# Patient Record
Sex: FEMALE | Race: WHITE | NOT HISPANIC OR LATINO | Employment: OTHER | URBAN - METROPOLITAN AREA
[De-identification: names, ages, dates, MRNs, and addresses within clinical notes are randomized per-mention and may not be internally consistent; named-entity substitution may affect disease eponyms.]

---

## 2017-01-17 ENCOUNTER — GENERIC CONVERSION - ENCOUNTER (OUTPATIENT)
Dept: OTHER | Facility: OTHER | Age: 82
End: 2017-01-17

## 2017-02-08 ENCOUNTER — GENERIC CONVERSION - ENCOUNTER (OUTPATIENT)
Dept: OTHER | Facility: OTHER | Age: 82
End: 2017-02-08

## 2018-01-12 NOTE — RESULT NOTES
Verified Results  (1) URINALYSIS w URINE C/S REFLEX (will reflex a microscopy if leukocytes, occult blood, or nitrites are not within normal limits) 23Sep2016 12:50PM Shelly Jimenez Order Number: [id number]     Test Name Result Flag Reference   COLOR Yellow     CLARITY Clear     PH UA 6 5  4 5-8 0   LEUKOCYTE ESTERASE UA Negative  Negative   NITRITE UA Negative  Negative   PROTEIN UA Negative mg/dl  Negative   GLUCOSE UA Negative mg/dl  Negative   KETONES UA Negative mg/dl  Negative   UROBILINOGEN UA 1 0 E U /dl  0 2, 1 0 E U /dl   BILIRUBIN UA Negative  Negative   BLOOD UA Negative  Negative   SPECIFIC GRAVITY UA 1 021  1 003-1 030     (1) CBC/PLT/DIFF 23Sep2016 12:49PM Shelly Jimenez Order Number: [id number]     Test Name Result Flag Reference   WBC COUNT 5 54 Thousand/uL  4 31-10 16   RBC COUNT 4 44 Million/uL  3 81-5 12   HEMOGLOBIN 13 2 g/dL  11 5-15 4   HEMATOCRIT 39 2 %  34 8-46  1   MCV 88 fL  82-98   MCH 29 7 pg  26 8-34 3   MCHC 33 7 g/dL  31 4-37 4   RDW 12 2 %  11 6-15 1   MPV 10 5 fL  8 9-12 7   PLATELET COUNT 191 Thousands/uL  149-390   nRBC AUTOMATED 0 /100 WBCs     NEUTROPHILS RELATIVE PERCENT 61 %  43-75   LYMPHOCYTES RELATIVE PERCENT 27 %  14-44   MONOCYTES RELATIVE PERCENT 11 %  4-12   EOSINOPHILS RELATIVE PERCENT 1 %  0-6   BASOPHILS RELATIVE PERCENT 0 %  0-1   NEUTROPHILS ABSOLUTE COUNT 3 38 Thousands/?L  1 85-7 62   LYMPHOCYTES ABSOLUTE COUNT 1 49 Thousands/?L  0 60-4 47   MONOCYTES ABSOLUTE COUNT 0 59 Thousand/?L  0 17-1 22   EOSINOPHILS ABSOLUTE COUNT 0 04 Thousand/?L  0 00-0 61   BASOPHILS ABSOLUTE COUNT 0 02 Thousands/?L  0 00-0 10   - Patient Instructions: This bloodwork is non-fasting  Please drink two glasses of water morning of bloodwork  - Patient Instructions: This bloodwork is non-fasting  Please drink two glasses of water morning of bloodwork       (1) COMPREHENSIVE METABOLIC PANEL 70CKP9342 26:54NX Rosa Isela BERMEO Order Number: [id number]     Test Name Result Flag Reference   GLUCOSE,RANDM 107 mg/dL     If the patient is fasting, the ADA then defines impaired fasting glucose as > 100 mg/dL and diabetes as > or equal to 123 mg/dL  SODIUM 139 mmol/L  136-145   POTASSIUM 4 2 mmol/L  3 5-5 3   CHLORIDE 103 mmol/L  100-108   CARBON DIOXIDE 28 mmol/L  21-32   ANION GAP (CALC) 8 mmol/L  4-13   BLOOD UREA NITROGEN 18 mg/dL  5-25   CREATININE 0 86 mg/dL  0 60-1 30   Standardized to IDMS reference method   CALCIUM 9 4 mg/dL  8 3-10 1   BILI, TOTAL 0 39 mg/dL  0 20-1 00   ALK PHOSPHATAS 79 U/L     ALT (SGPT) 19 U/L  12-78   AST(SGOT) 16 U/L  5-45   ALBUMIN 3 5 g/dL  3 5-5 0   TOTAL PROTEIN 7 2 g/dL  6 4-8 2   eGFR Non-African American      >60 0 ml/min/1 73[address] [facility] Disease Education Program recommendations are as follows:  GFR calculation is accurate only with a steady state creatinine  Chronic Kidney disease less than 60 ml/min/1 73 sq  meters  Kidney failure less than 15 ml/min/1 73 sq  meters

## 2018-01-12 NOTE — MISCELLANEOUS
Message  Kailalance Sweet from  hospice called office stating pt will be d/c from hospital and they will be treating her for hospice care  Per Dr Erik Andrews he will continue care for her  Active Problems    1  Abnormal blood chemistry (790 6) (R79 9)   2  Acute bronchitis (466 0) (J20 9)   3  Acute left-sided weakness (728 87) (M62 89)   4  Allergic rhinitis (477 9) (J30 9)   5  Alzheimers disease (331 0) (G30 9)   6  Asymptomatic menopausal state (V49 81) (Z78 0)   7  Benign colon polyp (211 3) (K63 5)   8  Bradycardia (427 89) (R00 1)   9  Cataract (366 9) (H26 9)   10  Depression (311) (F32 9)   11  Diverticulosis (562 10) (K57 90)   12  DM type 2 (diabetes mellitus, type 2) (250 00) (E11 9)   13  Encounter for screening for malignant neoplasm of colon (V76 51) (Z12 11)   14  Fall, accidental (E888 9) (W19 XXXA)   15  Gait disturbance (781 2) (R26 9)   16  Hyperlipidemia (272 4) (E78 5)   17  Knee pain (719 46) (M25 569)   18  Medicare annual wellness visit, initial (V70 0) (Z00 00)   19  Mobitz type II block (426 12) (I44 1)   20  Multiple falls (V15 88) (R29 6)   21  Need for influenza vaccination (V04 81) (Z23)   22  Need for tuberculosis vaccination (V03 2) (Z23)   23  Overactive bladder (596 51) (N32 81)   24  Preoperative general physical examination (V72 83) (Z01 818)   25  Screening for depression (V79 0) (Z13 89)   26  Screening for genitourinary condition (V81 6) (Z13 89)   27  Screening for neurological condition (V80 09) (Z13 89)   28  Transient ischemic attack (435 9) (G45 9)   29  Uncontrolled diabetes mellitus type 2 without complications (564 39) (Z77 89)   30  Urinary frequency (788 41) (R35 0)   31  Urinary tract infection (599 0) (N39 0)    Current Meds   1  Citalopram Hydrobromide 10 MG Oral Tablet; TAKE 1 TABLET DAILY; Therapy: 44BVR2557 to (Last Rx:22Apr2016)  Requested for: 22Apr2016 Ordered   2  Memantine HCl - 10 MG Oral Tablet; TAKE 1 TABLET TWICE DAILY; Therapy: 44JDX5825 to Recorded   3  MetFORMIN HCl  MG Oral Tablet Extended Release 24 Hour; TAKE 1 TABLET   DAILY AS DIRECTED; Therapy: 26JBN4905 to (Evaluate:10Dec2016)  Requested for: 91WMA9463; Last   Rx:13Jun2016 Ordered   4  TRUEplus Lancets 26G Miscellaneous; test twice daily and as needed; Therapy: 49EYF6240 to (Evaluate:12Jan2017)  Requested for: 38Oss0426; Last   Rx:72Mcb7532 Ordered   5  TRUEresult Blood Glucose w/Device Kit; USE AS DIRECTED; Therapy: 28MWY3726 to (Evaluate:14Oct2016)  Requested for: 35Med9026; Last   Rx:56Nso9237 Ordered   6  TRUEtest Test In Vitro Strip; TEST TWICE DAILY OR AS DIRECTED   ;   Therapy: 85XAC4274 to (Last Rx:42Hym2121)  Requested for: 22Pom4844 Ordered    Allergies    1   No Known Drug Allergies    Signatures   Electronically signed by : Michelle Stevens, ; Feb 8 2017 10:46AM EST                       (Author)

## 2018-01-16 NOTE — MISCELLANEOUS
Assessment    1  Acute left-sided weakness (728 87) (M62 89)   2  Gait disturbance (781 2) (R26 9)   3  Fall, accidental (E888 9) (I96 AGAO)    Plan  Acute left-sided weakness    · (1) CBC/PLT/DIFF; Status:Active; Requested APV:98ZNU9022;    Perform:[facility]; Due:97Hdf4923;Ordered; For:Acute left-sided weakness; Ordered By:Javid Quan;   · (1) COMPREHENSIVE METABOLIC PANEL; Status:Active; Requested VFA:53HYO8041;    Perform:[facility]; Due:58Tvw3602;Ordered; For:Acute left-sided weakness; Ordered By:Javid Quan;   · (1) URINALYSIS w URINE C/S REFLEX (will reflex a microscopy if leukocytes, occult  blood, or nitrites are not within normal limits); Status:Active; Requested URW:29OOM7173;    Perform:[facility]; Due:23Sep2017;Ordered; For:Acute left-sided weakness; Ordered By:Javid Quan;   · * CT HEAD WO CONTRAST; Status:Active; Requested PRN:[id number];    Perform:[facility]; TXJ:36NAR0568; Last Updated By:Felix Puri; 9/23/2016 12:59:49 PM;Ordered; For:Acute left-sided weakness; Ordered By:Javid Quan;  Hyperlipidemia    · Atorvastatin Calcium 20 MG Oral Tablet   Rx By: Daryle Baldy; Dispense: 90 Days ; #:90 Tablet; Refill: 3; For: Hyperlipidemia; VANI = N; Verified Transmission to [address]; Last Updated By: Laura Baker; 9/23/2016 11:37:32 AM  Need for influenza vaccination    · Fluzone High-Dose 0 5 ML Intramuscular Suspension Prefilled Syringe   For: Need for influenza vaccination; Ordered By:Sophie Quan; Effective Date:23Sep2016; Administered by: Laura Baker: 9/23/2016 11:51:00 AM    Discussion/Summary  Discussion Summary:   Hospital chart reviewed  CT of head with chronic changes and echo unchanged  Left sided changes are new since d/c  ??new CVA vs worsening SDAT  Will check CT scan  Doubt pt could tolerate MRI  Start empiric low dose ASA  Check labs and urine  Continue home PT  RTC one week        Chief Complaint  Chief Complaint Free Text Note Form: Patience Vicente - Fall  Patricia Gaston Pt doing better since fall   still has bruise on right side of head  Patricia Gaston Family states pt is leaning to left side and left hand/leg seems weaker than right    questions possible TIA? History of Present Illness  TCM Communication [facility]: The patient is being contacted for follow-up after hospitalization and pt scheduled for hospital f/u on 09/23/2016  Hospital records were not available  She was hospitalized at and [facility]  The date of admission: 08/25/2016, date of discharge: 09/07/2016  Diagnosis: General Weakness, Debilitation  She was discharged to home  Communication performed and completed by       HPI: ELVI with advanced SDAT, presents with her  and daughter for f/u recent admission at [address] [place] [address] [address]  Pt fell w/o LOC and was treated as a trauma  Admitted four days and w/u negative  D/c'd to the [facility] and spent some time there  Came home and receiving home PT  Family has notice pt to be leaning to the left and having weakness on that side as well  Pleasantly confused  Review of Systems  Complete-Female:   Constitutional: no fever, not feeling poorly, no chills and not feeling tired  Eyes: No complaints of eye pain, no red eyes, no eyesight problems, no discharge, no dry eyes, no itching of eyes  ENT: no complaints of earache, no loss of hearing, no nose bleeds, no nasal discharge, no sore throat, no hoarseness  Cardiovascular: no chest pain and no palpitations  Respiratory: no shortness of breath, no cough, no wheezing and no shortness of breath during exertion  Gastrointestinal: no abdominal pain, no nausea, no constipation and no diarrhea  Genitourinary: incontinence, but no dysuria  Musculoskeletal: No complaints of arthralgias, no myalgias, no joint swelling or stiffness, no limb pain or swelling  Neurological: confusion, limb weakness and difficulty walking, but no headache and no convulsions  Psychiatric: no anxiety and no depression  Active Problems    1  Abnormal blood chemistry (790 6) (R79 9)   2  Acute bronchitis (466 0) (J20 9)   3  Allergic rhinitis (477 9) (J30 9)   4  Alzheimers disease (331 0) (G30 9)   5  Asymptomatic menopausal state (V49 81) (Z78 0)   6  Benign colon polyp (211 3) (K63 5)   7  Bradycardia (427 89) (R00 1)   8  Cataract (366 9) (H26 9)   9  Depression (311) (F32 9)   10  Diverticulosis (562 10) (K57 90)   11  DM type 2 (diabetes mellitus, type 2) (250 00) (E11 9)   12  Encounter for screening for malignant neoplasm of colon (V76 51) (Z12 11)   13  Fall, accidental (E888 9) (W19 XXXA)   14  Gait disturbance (781 2) (R26 9)   15  Hyperlipidemia (272 4) (E78 5)   16  Knee pain (719 46) (M25 569)   17  Medicare annual wellness visit, initial (V70 0) (Z00 00)   18  Mobitz type II block (426 12) (I44 1)   19  Multiple falls (V15 88) (R29 6)   20  Need for influenza vaccination (V04 81) (Z23)   21  Need for tuberculosis vaccination (V03 2) (Z23)   22  Overactive bladder (596 51) (N32 81)   23  Preoperative general physical examination (V72 83) (Z01 818)   24  Screening for depression (V79 0) (Z13 89)   25  Screening for genitourinary condition (V81 6) (Z13 89)   26  Screening for neurological condition (V80 09) (Z13 89)   27  Transient ischemic attack (435 9) (G45 9)   28  Uncontrolled diabetes mellitus type 2 without complications (495 66) (B14 42)   29  Urinary frequency (788 41) (R35 0)   30  Urinary tract infection (599 0) (N39 0)    Past Medical History    1  History of Adult failure to thrive (783 7) (R62 7)   2  History of Benign Polyps Of The Large Intestine   3  History of Mental status change (780 97) (R41 82)   4  History of Onychomycosis (110 1) (B35 1)   5  History of Screening for genitourinary condition (V81 6) (Z13 89)   6  History of Screening for neurological condition (V80 09) (Z13 89)   7   History of Screening for neurological condition (V80 09) (Z13 89) 8  History of Screening for rheumatoid arthritis (V82 1) (I82 916)    Surgical History    1  History of Appendectomy   2  History of Hysterectomy  Surgical History Reviewed: The surgical history was reviewed and updated today  Family History  Mother    1  No pertinent family history  Family History Reviewed: The family history was reviewed and updated today  Social History    · Marital History - Currently    · Never a smoker  Social History Reviewed: The social history was reviewed and updated today  The social history was reviewed and is unchanged  Current Meds   1  Atorvastatin Calcium 20 MG Oral Tablet; Take 1 tablet daily; Therapy: 87Pkp0303 to (Evaluate:22Jan2017)  Requested for: 37BIZ3048; Last   Rx:28Jan2016 Ordered   2  Citalopram Hydrobromide 10 MG Oral Tablet; TAKE 1 TABLET DAILY; Therapy: 37ZRJ8096 to (Last Rx:22Apr2016)  Requested for: 22Apr2016 Ordered   3  Memantine HCl - 10 MG Oral Tablet; TAKE 1 TABLET TWICE DAILY; Therapy: 68QGR1404 to Recorded   4  MetFORMIN HCl  MG Oral Tablet Extended Release 24 Hour; TAKE 1 TABLET   DAILY AS DIRECTED; Therapy: 94WXX0336 to (Evaluate:52Kpe1962)  Requested for: 95DCP7790; Last   Rx:13Jun2016 Ordered   5  TRUEplus Lancets 26G Miscellaneous; test twice daily and as needed; Therapy: 37JBZ8746 to (Evaluate:12Jan2017)  Requested for: 10Jha9175; Last   Rx:86Czg3272 Ordered   6  TRUEresult Blood Glucose w/Device Kit; USE AS DIRECTED; Therapy: 55KGA4703 to (Evaluate:14Oct2016)  Requested for: 42Inj9031; Last   Rx:16Cqb3279 Ordered   7  TRUEtest Test In Vitro Strip; TEST TWICE DAILY OR AS DIRECTED   ;   Therapy: 37ULT6986 to (Last Rx:99Vne2554)  Requested for: 88Gbe0087 Ordered  Medication List Reviewed: The medication list was reviewed and updated today  Allergies    1   No Known Drug Allergies    Vitals  Signs   Recorded: 92CPJ8455 91:40EJ   Systolic: 430  Diastolic: 64  Heart Rate: 82  Respiration: 16  Temperature: 98 1 F  Height: 5 ft   Weight: 144 lb   BMI Calculated: 28 12  BSA Calculated: 1 62    Physical Exam    Constitutional   General appearance: No acute distress, well appearing and well nourished  Eyes   Conjunctiva and lids: No swelling, erythema or discharge  Pupils and irises: Equal, round and reactive to light  Ears, Nose, Mouth, and Throat   Oropharynx: Normal with no erythema, edema, exudate or lesions  Pulmonary   Respiratory effort: No increased work of breathing or signs of respiratory distress  Auscultation of lungs: Clear to auscultation  Cardiovascular   Auscultation of heart: Normal rate and rhythm, normal S1 and S2, without murmurs  Examination of extremities for edema and/or varicosities: Normal     Carotid pulses: Normal     Abdomen   Abdomen: Non-tender, no masses  Musculoskeletal   Gait and station: Abnormal   weak  Difficulty going from sitting to standing and plops down  Balance is off  Neurologic   Cranial nerves: Cranial nerves 2-12 intact  Psychiatric   Orientation to person, place, and time: Abnormal   confused  Mood and affect: Normal     Additional Exam:  Left sided diffuse weakness noted  Health Management  DM type 2 (diabetes mellitus, type 2)   (1) LIPID PANEL, FASTING; every 1 year; Last 62Xmz9301; Next Due: 98MBY8668; Overdue  *VB - Foot Exam; every 1 year; Last 36Kir2874; Next Due: 01Hhg2537; Overdue  Encounter for screening for malignant neoplasm of colon   COLONOSCOPY; every 10 years; Last 77Lrm6151; Next Due: 27Ygb3592; Active    Future Appointments    Date/Time Provider Specialty Site   10/14/2016 10:30 AM CLIFFORD Saldana  Internal Medicine [facility]   10/17/2016 02:45 PM CLIFFORD Saldana   Internal Medicine [facility] 9[address]     Signatures   Electronically signed by : CLIFFORD Jimenez ; Sep 23 2016  1:12PM EST                       (Author)

## 2018-01-16 NOTE — RESULT NOTES
Verified Results  (1) HEPATIC FUNCTION PANEL 09Aug2016 03:13PM [employer] Order Number: [id number]     Test Name Result Flag Reference   ALBUMIN 3 8 g/dL  3 5-5 0   ALK PHOSPHATAS 84 U/L     ALT (SGPT) 20 U/L  12-78   AST(SGOT) 17 U/L  5-45   BILI, DIRECT 0 13 mg/dL  0 00-0 20   BILI, TOTAL 0 50 mg/dL  0 20-1 00   TOTAL PROTEIN 7 2 g/dL  6 4-8 2     (1) HEMOGLOBIN A1C 09Aug2016 03:13PM [employer] Order Number: [id number]     Test Name Result Flag Reference   HEMOGLOBIN A1C 7 2 % H 4 2-6 3   EST  AVG  GLUCOSE 160 mg/dl       (1) LDL,DIRECT 99BUY9289 03:13PM [employer] Order Number: [id number]     Test Name Result Flag Reference   LDL, DIRECT 152 mg/dl H 0-100   LDL Cholesterol:        Optimal          <100 mg/dl        Near Optimal     100-129 mg/dl        Above Optimal          Borderline High   130-159 mg/dl          High              160-189 mg/dl          Very High        >189 mg/dl     (1) TRIGLYCERIDE 09Aug2016 03:13PM [employer] Order Number: [id number]     Test Name Result Flag Reference   TRIGLYCERIDES 63 mg/dL  <=150   Specimen collection should occur prior to N-Acetylcysteine or Metamizole administration due to the potential for falsely depressed results        Triglyceride:         Normal              <150 mg/dl       Borderline High    150-199 mg/dl       High               200-499 mg/dl       Very High          >499 mg/dl